# Patient Record
Sex: FEMALE | Race: BLACK OR AFRICAN AMERICAN | NOT HISPANIC OR LATINO | Employment: UNEMPLOYED | URBAN - METROPOLITAN AREA
[De-identification: names, ages, dates, MRNs, and addresses within clinical notes are randomized per-mention and may not be internally consistent; named-entity substitution may affect disease eponyms.]

---

## 2022-03-28 ENCOUNTER — DOCUMENTATION ONLY (OUTPATIENT)
Dept: CARDIOLOGY | Facility: CLINIC | Age: 57
End: 2022-03-28

## 2022-04-07 ENCOUNTER — DOCUMENTATION ONLY (OUTPATIENT)
Dept: CARDIOLOGY | Facility: CLINIC | Age: 57
End: 2022-04-07

## 2022-04-07 ENCOUNTER — TELEPHONE (OUTPATIENT)
Dept: CARDIOLOGY | Facility: CLINIC | Age: 57
End: 2022-04-07

## 2022-04-07 NOTE — TELEPHONE ENCOUNTER
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  Dr Montes has reviewed the outside cath and CTA and called and left patient a message to call back to schedule a virtual visit with him since she is from [place].  Records scanned in.

## 2022-04-07 NOTE — PROGRESS NOTES
Referral Note    Ref:  Papito Burgos    Reason for referral:  Dilated main pulmonary artery    CTA:  Main PA 4.6cm in diameter.  Normal R and L PA's.  Normal small vessls.  Note:  On [employer]    Coronary angiogram:  Normal coronaries with prominent thebesian veins. Note:  On SR [employer]     Imp:  Dilated main pulmonary artery:  This finding is a variant of normal and has no clinical significance.  Specifically, does not rupture.    Rec:  Telemed visit for patient to explain and CC note to Papito Burgos after.

## 2022-05-04 ENCOUNTER — OFFICE VISIT (OUTPATIENT)
Dept: CARDIOLOGY | Facility: CLINIC | Age: 57
End: 2022-05-04
Payer: MEDICAID

## 2022-05-04 ENCOUNTER — DOCUMENTATION ONLY (OUTPATIENT)
Dept: CARDIOLOGY | Facility: CLINIC | Age: 57
End: 2022-05-04
Payer: MEDICAID

## 2022-05-04 DIAGNOSIS — Q25.79 CONGENITAL DILATION OF PULMONARY ARTERY: Primary | ICD-10-CM

## 2022-05-04 PROCEDURE — 99203 OFFICE O/P NEW LOW 30 MIN: CPT | Mod: 95,,, | Performed by: INTERNAL MEDICINE

## 2022-05-04 PROCEDURE — 99203 PR OFFICE/OUTPT VISIT, NEW, LEVL III, 30-44 MIN: ICD-10-PCS | Mod: 95,,, | Performed by: INTERNAL MEDICINE

## 2022-05-04 RX ORDER — MELOXICAM 15 MG/1
15 TABLET ORAL DAILY
COMMUNITY
Start: 2022-05-02

## 2022-05-04 RX ORDER — TRIAMCINOLONE ACETONIDE 5 MG/G
OINTMENT TOPICAL 2 TIMES DAILY
COMMUNITY
Start: 2022-05-02

## 2022-05-04 RX ORDER — FAMOTIDINE 20 MG/1
20 TABLET, FILM COATED ORAL NIGHTLY PRN
COMMUNITY
Start: 2022-05-02

## 2022-05-04 RX ORDER — HYDROCHLOROTHIAZIDE 25 MG/1
25 TABLET ORAL EVERY MORNING
COMMUNITY
Start: 2022-05-02

## 2022-05-04 RX ORDER — DICLOFENAC SODIUM 10 MG/G
GEL TOPICAL
COMMUNITY
Start: 2022-04-07

## 2022-05-04 RX ORDER — GRISEOFULVIN (MICROSIZE) 125 MG/5ML
SUSPENSION ORAL
COMMUNITY
Start: 2022-05-03

## 2022-05-04 RX ORDER — IBUPROFEN 800 MG/1
800 TABLET ORAL 2 TIMES DAILY
COMMUNITY
Start: 2022-03-09

## 2022-05-04 RX ORDER — HYDROXYZINE PAMOATE 25 MG/1
50 CAPSULE ORAL EVERY 8 HOURS PRN
COMMUNITY
Start: 2022-05-02

## 2022-05-04 RX ORDER — PANTOPRAZOLE SODIUM 40 MG/1
40 TABLET, DELAYED RELEASE ORAL EVERY MORNING
COMMUNITY
Start: 2022-05-02

## 2022-05-04 NOTE — PROGRESS NOTES
Subjective:    Patient ID:  Dontrell Mitchell is a 56 y.o. female who presents for evaluation of a dilated main pulmonary artery on CTA.    The following visit is a virtual visit due:  Patient location: Home  Visit type: Virtual visit with realtime audio and video.  Total time spent with patient: 35 min with more than 50% of this time being spent on direct counseling and coordination of care.     Each patient to whom he or she provides medical services by telemedicine is:  (1) informed of the relationship between the physician and patient and the respective role of any other health care provider with respect to management of the patient; and (2) notified that he or she may decline to receive medical services by telemedicine and may withdraw from such care at any time.    Ref:  Papito CALLEJAS This asymptomatic 57 yo lady was referred by Papito Burgos for a dilated pulmonary artery.  It was found incidentally on a CTA of the chest.    Review of Systems   Constitutional: Negative for malaise/fatigue, weight gain and weight loss.   HENT: Negative for congestion, nosebleeds and sore throat.    Eyes: Negative for blurred vision, double vision, pain and visual disturbance.   Cardiovascular: Negative for chest pain, claudication, cyanosis, dyspnea on exertion, irregular heartbeat, leg swelling, near-syncope, orthopnea, palpitations, paroxysmal nocturnal dyspnea and syncope.   Respiratory: Negative for cough, hemoptysis, shortness of breath, snoring, sputum production and wheezing.    Endocrine: Negative for polydipsia and polyuria.   Hematologic/Lymphatic: Negative for bleeding problem. Does not bruise/bleed easily.   Skin: Negative for color change, poor wound healing and rash.   Musculoskeletal: Negative for arthritis, back pain, joint pain, muscle weakness, myalgias and neck pain.   Gastrointestinal: Negative for abdominal pain, anorexia, constipation, diarrhea, heartburn, hematemesis, hematochezia, hemorrhoids,  jaundice, melena, nausea and vomiting.   Genitourinary: Negative for flank pain, hematuria, hesitancy, incomplete emptying, menorrhagia and nocturia.   Neurological: Negative for excessive daytime sleepiness, dizziness, focal weakness, headaches, light-headedness, loss of balance, numbness, paresthesias, seizures, sensory change, tremors, vertigo and weakness.   Psychiatric/Behavioral: Negative for altered mental status, depression, hallucinations and memory loss. The patient does not have insomnia and is not nervous/anxious.         Objective:    Physical Exam Deferred, Telemedicine visit         CTA: Main PA 4.6cm in diameter.  Normal R and L PA's.  Normal small vessls.  Note:  On [employer]     Coronary angiogram:  Normal coronaries with prominent thebesian veins. Note:  On SR [employer]     Assessment:       1. Dilated Main pulmonary artery:  Incidental and innocent finding. Not pathological.     Plan:       1. Reassurance  2. No f/u needed for this.  3. Cleared for any procedures without SBE prophylaxis.

## 2024-06-25 NOTE — PROGRESS NOTES
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 Burgos.  Spoke to Erika @ office and requested CTA report and films of both CTA/LHC be sent to Dr. Montes.  Fax number and mailing address provided.  Once records received and reviewed will contact patient for appointments.      None

## 2025-02-03 ENCOUNTER — LAB REQUISITION (OUTPATIENT)
Dept: LAB | Facility: HOSPITAL | Age: 60
End: 2025-02-03
Payer: MEDICAID

## 2025-02-03 DIAGNOSIS — R79.9 ABNORMAL FINDING OF BLOOD CHEMISTRY, UNSPECIFIED: ICD-10-CM

## 2025-02-03 LAB
GRAM STN SPEC: NORMAL
KOH PREP SPEC: NORMAL

## 2025-02-03 PROCEDURE — 87070 CULTURE OTHR SPECIMN AEROBIC: CPT | Performed by: ORTHOPAEDIC SURGERY

## 2025-02-03 PROCEDURE — 87220 TISSUE EXAM FOR FUNGI: CPT | Performed by: ORTHOPAEDIC SURGERY

## 2025-02-03 PROCEDURE — 87075 CULTR BACTERIA EXCEPT BLOOD: CPT | Performed by: ORTHOPAEDIC SURGERY

## 2025-02-03 PROCEDURE — 87205 SMEAR GRAM STAIN: CPT | Performed by: ORTHOPAEDIC SURGERY

## 2025-02-06 LAB
BACTERIA SPEC ANAEROBE CULT: NORMAL
BACTERIA WND CULT: NO GROWTH